# Patient Record
Sex: MALE | Race: WHITE | NOT HISPANIC OR LATINO
[De-identification: names, ages, dates, MRNs, and addresses within clinical notes are randomized per-mention and may not be internally consistent; named-entity substitution may affect disease eponyms.]

---

## 2018-12-05 PROBLEM — Z00.00 ENCOUNTER FOR PREVENTIVE HEALTH EXAMINATION: Status: ACTIVE | Noted: 2018-12-05

## 2018-12-06 ENCOUNTER — APPOINTMENT (OUTPATIENT)
Dept: SURGICAL ONCOLOGY | Facility: CLINIC | Age: 83
End: 2018-12-06
Payer: MEDICARE

## 2018-12-06 VITALS
OXYGEN SATURATION: 97 % | BODY MASS INDEX: 29.8 KG/M2 | HEART RATE: 72 BPM | SYSTOLIC BLOOD PRESSURE: 155 MMHG | HEIGHT: 72 IN | WEIGHT: 220 LBS | DIASTOLIC BLOOD PRESSURE: 74 MMHG

## 2018-12-06 PROCEDURE — 99204 OFFICE O/P NEW MOD 45 MIN: CPT

## 2018-12-14 ENCOUNTER — RESULT REVIEW (OUTPATIENT)
Age: 83
End: 2018-12-14

## 2018-12-30 ENCOUNTER — FORM ENCOUNTER (OUTPATIENT)
Age: 83
End: 2018-12-30

## 2018-12-31 ENCOUNTER — OUTPATIENT (OUTPATIENT)
Dept: OUTPATIENT SERVICES | Facility: HOSPITAL | Age: 83
LOS: 1 days | End: 2018-12-31
Payer: MEDICARE

## 2018-12-31 VITALS
HEIGHT: 73 IN | DIASTOLIC BLOOD PRESSURE: 92 MMHG | OXYGEN SATURATION: 99 % | WEIGHT: 216.93 LBS | RESPIRATION RATE: 18 BRPM | TEMPERATURE: 97 F | HEART RATE: 62 BPM | SYSTOLIC BLOOD PRESSURE: 155 MMHG

## 2018-12-31 DIAGNOSIS — H70.91 UNSPECIFIED MASTOIDITIS, RIGHT EAR: Chronic | ICD-10-CM

## 2018-12-31 DIAGNOSIS — Z90.49 ACQUIRED ABSENCE OF OTHER SPECIFIED PARTS OF DIGESTIVE TRACT: Chronic | ICD-10-CM

## 2018-12-31 DIAGNOSIS — C43.59 MALIGNANT MELANOMA OF OTHER PART OF TRUNK: ICD-10-CM

## 2018-12-31 DIAGNOSIS — I48.91 UNSPECIFIED ATRIAL FIBRILLATION: ICD-10-CM

## 2018-12-31 DIAGNOSIS — Z01.818 ENCOUNTER FOR OTHER PREPROCEDURAL EXAMINATION: ICD-10-CM

## 2018-12-31 DIAGNOSIS — E11.9 TYPE 2 DIABETES MELLITUS WITHOUT COMPLICATIONS: ICD-10-CM

## 2018-12-31 DIAGNOSIS — Z98.890 OTHER SPECIFIED POSTPROCEDURAL STATES: Chronic | ICD-10-CM

## 2018-12-31 DIAGNOSIS — I10 ESSENTIAL (PRIMARY) HYPERTENSION: ICD-10-CM

## 2018-12-31 DIAGNOSIS — Z98.49 CATARACT EXTRACTION STATUS, UNSPECIFIED EYE: Chronic | ICD-10-CM

## 2018-12-31 LAB
ANION GAP SERPL CALC-SCNC: 14 MMOL/L — SIGNIFICANT CHANGE UP (ref 5–17)
BUN SERPL-MCNC: 34 MG/DL — HIGH (ref 7–23)
CALCIUM SERPL-MCNC: 9.4 MG/DL — SIGNIFICANT CHANGE UP (ref 8.4–10.5)
CHLORIDE SERPL-SCNC: 106 MMOL/L — SIGNIFICANT CHANGE UP (ref 96–108)
CO2 SERPL-SCNC: 23 MMOL/L — SIGNIFICANT CHANGE UP (ref 22–31)
CREAT SERPL-MCNC: 1.62 MG/DL — HIGH (ref 0.5–1.3)
GLUCOSE SERPL-MCNC: 335 MG/DL — HIGH (ref 70–99)
HCT VFR BLD CALC: 43.2 % — SIGNIFICANT CHANGE UP (ref 39–50)
HGB BLD-MCNC: 14.4 G/DL — SIGNIFICANT CHANGE UP (ref 13–17)
LDH SERPL L TO P-CCNC: 268 U/L — HIGH (ref 50–242)
MCHC RBC-ENTMCNC: 31.4 PG — SIGNIFICANT CHANGE UP (ref 27–34)
MCHC RBC-ENTMCNC: 33.3 GM/DL — SIGNIFICANT CHANGE UP (ref 32–36)
MCV RBC AUTO: 94.3 FL — SIGNIFICANT CHANGE UP (ref 80–100)
PLATELET # BLD AUTO: 166 K/UL — SIGNIFICANT CHANGE UP (ref 150–400)
POTASSIUM SERPL-MCNC: 4.3 MMOL/L — SIGNIFICANT CHANGE UP (ref 3.5–5.3)
POTASSIUM SERPL-SCNC: 4.3 MMOL/L — SIGNIFICANT CHANGE UP (ref 3.5–5.3)
RBC # BLD: 4.58 M/UL — SIGNIFICANT CHANGE UP (ref 4.2–5.8)
RBC # FLD: 13.7 % — SIGNIFICANT CHANGE UP (ref 10.3–14.5)
SODIUM SERPL-SCNC: 143 MMOL/L — SIGNIFICANT CHANGE UP (ref 135–145)
WBC # BLD: 7.16 K/UL — SIGNIFICANT CHANGE UP (ref 3.8–10.5)
WBC # FLD AUTO: 7.16 K/UL — SIGNIFICANT CHANGE UP (ref 3.8–10.5)

## 2018-12-31 PROCEDURE — 80048 BASIC METABOLIC PNL TOTAL CA: CPT

## 2018-12-31 PROCEDURE — 85027 COMPLETE CBC AUTOMATED: CPT

## 2018-12-31 PROCEDURE — 83615 LACTATE (LD) (LDH) ENZYME: CPT

## 2018-12-31 PROCEDURE — 71046 X-RAY EXAM CHEST 2 VIEWS: CPT

## 2018-12-31 PROCEDURE — 71046 X-RAY EXAM CHEST 2 VIEWS: CPT | Mod: 26

## 2018-12-31 PROCEDURE — G0463: CPT

## 2018-12-31 RX ORDER — LIDOCAINE HCL 20 MG/ML
0.2 VIAL (ML) INJECTION ONCE
Qty: 0 | Refills: 0 | Status: DISCONTINUED | OUTPATIENT
Start: 2019-01-10 | End: 2019-01-25

## 2018-12-31 RX ORDER — SODIUM CHLORIDE 9 MG/ML
3 INJECTION INTRAMUSCULAR; INTRAVENOUS; SUBCUTANEOUS EVERY 8 HOURS
Qty: 0 | Refills: 0 | Status: DISCONTINUED | OUTPATIENT
Start: 2019-01-10 | End: 2019-01-25

## 2018-12-31 NOTE — H&P PST ADULT - ATTENDING COMMENTS
83-year-old man with an uncomplicated 0.8 mm melanoma on his left upper back schedule for appropriate excision with reconstruction by Dr. Johnston.    His diagnosis and the plan of management were reviewed with him and his wife in my office, and again the morning of operation.    All questions answered, consent on chart

## 2018-12-31 NOTE — H&P PST ADULT - PSH
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

## 2018-12-31 NOTE — H&P PST ADULT - PROBLEM SELECTOR PLAN 2
Pt to decrease Lantus by 20% night prior, last dose of Glyburide in AM day prior to sx and last dose of Apidra w/ dinner night prior to sx. Pt instructed to check fingerstick in AM day of procedure.

## 2018-12-31 NOTE — H&P PST ADULT - PROBLEM SELECTOR PLAN 1
Pt scheduled for procedure on 1/10/2019. Surgical and Chlorhexidine instructions reviewed w/ pt and spouse.

## 2018-12-31 NOTE — H&P PST ADULT - HISTORY OF PRESENT ILLNESS
83yr old male presents to PST foe a Wide Excision Melanoma Left Upper Back w/ Complex Closure of The Back on 1/10/2019. 83yr old male presents to PST foe a Wide Excision Melanoma Left Upper Back w/ Complex Closure of The Back on 1/10/2019. PMH: Afib (s/p ablation ~15 yrs ago, on Pradaxa), HTN, HLD, DM2 (on insulin) and melanoma on left upper back. Pt to see PCP on 1/3/2019 for medical evaluation. Last seen by Cardiologist ~1 month ago, echo/stress done pt states all normal. Denies chest pain or SOB and feels well otherwise. 83yr old male presents to PST for a Wide Excision Melanoma Left Upper Back w/ Complex Closure of The Back on 1/10/2019. PMH: Afib (s/p ablation ~15 yrs ago, on Pradaxa), HTN, HLD, DM2 (on insulin) and melanoma on left upper back. Pt to see PCP on 1/3/2019 for medical evaluation. Last seen by Cardiologist ~1 month ago, echo/stress done pt states all normal. Denies chest pain or SOB and feels well otherwise.

## 2018-12-31 NOTE — H&P PST ADULT - PMH
Afib    Diabetes mellitus type 2, insulin dependent    Hyperlipidemia    Hypertension    Malignant melanoma of other part of trunk

## 2019-01-03 PROBLEM — C43.59 MALIGNANT MELANOMA OF OTHER PART OF TRUNK: Chronic | Status: ACTIVE | Noted: 2018-12-31

## 2019-01-03 PROBLEM — E11.9 TYPE 2 DIABETES MELLITUS WITHOUT COMPLICATIONS: Chronic | Status: ACTIVE | Noted: 2018-12-31

## 2019-01-03 PROBLEM — E78.5 HYPERLIPIDEMIA, UNSPECIFIED: Chronic | Status: ACTIVE | Noted: 2018-12-31

## 2019-01-03 PROBLEM — I48.91 UNSPECIFIED ATRIAL FIBRILLATION: Chronic | Status: ACTIVE | Noted: 2018-12-31

## 2019-01-03 PROBLEM — I10 ESSENTIAL (PRIMARY) HYPERTENSION: Chronic | Status: ACTIVE | Noted: 2018-12-31

## 2019-01-08 ENCOUNTER — APPOINTMENT (OUTPATIENT)
Dept: PLASTIC SURGERY | Facility: CLINIC | Age: 84
End: 2019-01-08
Payer: MEDICARE

## 2019-01-08 PROCEDURE — 99203 OFFICE O/P NEW LOW 30 MIN: CPT

## 2019-01-08 NOTE — HISTORY OF PRESENT ILLNESS
[FreeTextEntry1] : 83-year-old referred by Dr. Abbott with the recent diagnosis of an uncomplicated 0.75 mm, Jhon's level III, T1a melanoma of his left upper back.\par \par This was an asymptomatic pigmented lesion discovered on routine dermatologic evaluation.\par \par In the past he has had non-melanoma skin cancers.\par There is no prior personal history of melanoma.\par \par There is no other personal history of malignancy.\par \par There are no relatives of skin cancer.\par \par There are no relatives with cancer.\par \par His internist is Dr. Brent Padilla.\par He does not have a pacemaker or defibrillator.\par \par He does have atrial fibrillation and MR for which he takes daily PRADAXA.\par His cardiologist is Dr. Erlin Christianson.\par Hypertension is treated with atenolol, amlodipine, lisinopril, and Lasix.\par \par He is also DIABETIC.\par His endocrinologist is Dr. Noman Stahl.\par He takes daily Apidra, Lantus, and glyburide\par \par His dermatologist is Dr. Munira Alfaro.\par \par None examination in November 2018 by Dr. Sanchez was unremarkable.\par \par His last colonoscopy was @ 75

## 2019-01-10 ENCOUNTER — OUTPATIENT (OUTPATIENT)
Dept: OUTPATIENT SERVICES | Facility: HOSPITAL | Age: 84
LOS: 1 days | End: 2019-01-10
Payer: MEDICARE

## 2019-01-10 ENCOUNTER — APPOINTMENT (OUTPATIENT)
Dept: SURGICAL ONCOLOGY | Facility: HOSPITAL | Age: 84
End: 2019-01-10

## 2019-01-10 ENCOUNTER — RESULT REVIEW (OUTPATIENT)
Age: 84
End: 2019-01-10

## 2019-01-10 VITALS
HEART RATE: 62 BPM | RESPIRATION RATE: 18 BRPM | HEIGHT: 73 IN | SYSTOLIC BLOOD PRESSURE: 167 MMHG | WEIGHT: 216.93 LBS | DIASTOLIC BLOOD PRESSURE: 92 MMHG | OXYGEN SATURATION: 99 % | TEMPERATURE: 97 F

## 2019-01-10 VITALS
OXYGEN SATURATION: 100 % | HEART RATE: 51 BPM | SYSTOLIC BLOOD PRESSURE: 154 MMHG | RESPIRATION RATE: 15 BRPM | DIASTOLIC BLOOD PRESSURE: 65 MMHG

## 2019-01-10 DIAGNOSIS — Z98.49 CATARACT EXTRACTION STATUS, UNSPECIFIED EYE: Chronic | ICD-10-CM

## 2019-01-10 DIAGNOSIS — H70.91 UNSPECIFIED MASTOIDITIS, RIGHT EAR: Chronic | ICD-10-CM

## 2019-01-10 DIAGNOSIS — C43.59 MALIGNANT MELANOMA OF OTHER PART OF TRUNK: ICD-10-CM

## 2019-01-10 DIAGNOSIS — Z98.890 OTHER SPECIFIED POSTPROCEDURAL STATES: Chronic | ICD-10-CM

## 2019-01-10 DIAGNOSIS — Z90.49 ACQUIRED ABSENCE OF OTHER SPECIFIED PARTS OF DIGESTIVE TRACT: Chronic | ICD-10-CM

## 2019-01-10 LAB — GLUCOSE BLDC GLUCOMTR-MCNC: 136 MG/DL — HIGH (ref 70–99)

## 2019-01-10 PROCEDURE — 13101 CMPLX RPR TRUNK 2.6-7.5 CM: CPT

## 2019-01-10 PROCEDURE — 82962 GLUCOSE BLOOD TEST: CPT

## 2019-01-10 PROCEDURE — 88305 TISSUE EXAM BY PATHOLOGIST: CPT | Mod: 26

## 2019-01-10 PROCEDURE — 88305 TISSUE EXAM BY PATHOLOGIST: CPT

## 2019-01-10 PROCEDURE — 88342 IMHCHEM/IMCYTCHM 1ST ANTB: CPT

## 2019-01-10 PROCEDURE — 88342 IMHCHEM/IMCYTCHM 1ST ANTB: CPT | Mod: 26

## 2019-01-10 PROCEDURE — 88341 IMHCHEM/IMCYTCHM EA ADD ANTB: CPT

## 2019-01-10 PROCEDURE — 88341 IMHCHEM/IMCYTCHM EA ADD ANTB: CPT | Mod: 26

## 2019-01-10 PROCEDURE — 11604 EXC TR-EXT MAL+MARG 3.1-4 CM: CPT

## 2019-01-10 PROCEDURE — 14001 TIS TRNFR TRUNK 10.1-30SQCM: CPT

## 2019-01-10 RX ORDER — SODIUM CHLORIDE 9 MG/ML
1000 INJECTION, SOLUTION INTRAVENOUS
Qty: 0 | Refills: 0 | Status: DISCONTINUED | OUTPATIENT
Start: 2019-01-10 | End: 2019-01-25

## 2019-01-10 RX ORDER — CELECOXIB 200 MG/1
200 CAPSULE ORAL ONCE
Qty: 0 | Refills: 0 | Status: DISCONTINUED | OUTPATIENT
Start: 2019-01-10 | End: 2019-01-25

## 2019-01-10 RX ORDER — ONDANSETRON 8 MG/1
4 TABLET, FILM COATED ORAL ONCE
Qty: 0 | Refills: 0 | Status: DISCONTINUED | OUTPATIENT
Start: 2019-01-10 | End: 2019-01-25

## 2019-01-10 RX ORDER — FUROSEMIDE 40 MG
1 TABLET ORAL
Qty: 0 | Refills: 0 | COMMUNITY

## 2019-01-10 RX ORDER — DABIGATRAN ETEXILATE MESYLATE 150 MG/1
1 CAPSULE ORAL
Qty: 0 | Refills: 0 | COMMUNITY

## 2019-01-10 RX ORDER — CHLORZOXAZONE 250 MG
1 TABLET ORAL
Qty: 0 | Refills: 0 | COMMUNITY

## 2019-01-10 RX ORDER — AMLODIPINE BESYLATE 2.5 MG/1
1 TABLET ORAL
Qty: 0 | Refills: 0 | COMMUNITY

## 2019-01-10 RX ORDER — INSULIN GLULISINE 100 [IU]/ML
9 INJECTION, SOLUTION SUBCUTANEOUS
Qty: 0 | Refills: 0 | COMMUNITY

## 2019-01-10 RX ORDER — ATENOLOL 25 MG/1
1 TABLET ORAL
Qty: 0 | Refills: 0 | COMMUNITY

## 2019-01-10 RX ORDER — ATORVASTATIN CALCIUM 80 MG/1
1 TABLET, FILM COATED ORAL
Qty: 0 | Refills: 0 | COMMUNITY

## 2019-01-10 RX ORDER — GLYBURIDE 5 MG
1 TABLET ORAL
Qty: 0 | Refills: 0 | COMMUNITY

## 2019-01-10 RX ORDER — INSULIN GLARGINE 100 [IU]/ML
11 INJECTION, SOLUTION SUBCUTANEOUS
Qty: 0 | Refills: 0 | COMMUNITY

## 2019-01-10 RX ORDER — LISINOPRIL 2.5 MG/1
1 TABLET ORAL
Qty: 0 | Refills: 0 | COMMUNITY

## 2019-01-10 NOTE — PRE-ANESTHESIA EVALUATION ADULT - NSANTHPEFT_GEN_ALL_CORE
injected left conjunctiva.  pt states asymptomatic,   advised to follow-up and seek medical attention if continues or worsens  pt and wife understand

## 2019-01-10 NOTE — ASU DISCHARGE PLAN (ADULT/PEDIATRIC). - ITEMS TO FOLLOWUP WITH YOUR PHYSICIAN'S
Initial followup with plastic surgery in the next week or two.    Dr. Abbott should call with pathology report by January 21, the conversation will determine further management

## 2019-01-10 NOTE — ASU DISCHARGE PLAN (ADULT/PEDIATRIC). - NOTIFY
Fever greater than 101/Numbness, tingling/Inability to Tolerate Liquids or Foods/Pain not relieved by Medications/Persistent Nausea and Vomiting/Unable to Urinate/Increased Irritability or Sluggishness/Swelling that continues/Bleeding that does not stop/Numbness, color, or temperature change to extremity/Excessive Diarrhea

## 2019-01-10 NOTE — PRE-ANESTHESIA EVALUATION ADULT - NSANTHPMHFT_GEN_ALL_CORE
fs 136  norm stress 11/18  denies CP, SOB, or palpitations  meal related heartburn  no anesthetic complications

## 2019-01-10 NOTE — ASU PATIENT PROFILE, ADULT - PSH
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

## 2019-01-10 NOTE — BRIEF OPERATIVE NOTE - PROCEDURE
<<-----Click on this checkbox to enter Procedure Melanoma excision  01/10/2019  Left upper back, reconstruction  Active  MBEG

## 2019-01-16 LAB — SURGICAL PATHOLOGY STUDY: SIGNIFICANT CHANGE UP

## 2019-02-01 ENCOUNTER — APPOINTMENT (OUTPATIENT)
Dept: PLASTIC SURGERY | Facility: CLINIC | Age: 84
End: 2019-02-01
Payer: MEDICARE

## 2019-02-01 VITALS
BODY MASS INDEX: 29.8 KG/M2 | SYSTOLIC BLOOD PRESSURE: 159 MMHG | HEIGHT: 72 IN | WEIGHT: 220 LBS | OXYGEN SATURATION: 100 % | HEART RATE: 52 BPM | DIASTOLIC BLOOD PRESSURE: 76 MMHG

## 2019-02-01 PROCEDURE — 99024 POSTOP FOLLOW-UP VISIT: CPT

## 2019-02-01 NOTE — HISTORY OF PRESENT ILLNESS
[FreeTextEntry1] : 83-year-old presents for follow up visit status post uncomplicated 0.75 mm, Jhon's level III, T1a melanoma of his left upper back with complex closure. Patient is doing well no fever no chills no drainage pain well-controlled \par \par This was an asymptomatic pigmented lesion discovered on routine dermatologic evaluation.\par \par In the past he has had non-melanoma skin cancers.\par There is no prior personal history of melanoma.\par \par There is no other personal history of malignancy.\par \par There are no relatives of skin cancer.\par \par There are no relatives with cancer.\par \par His internist is Dr. Bernt Padilla.\par He does not have a pacemaker or defibrillator.\par \par He does have atrial fibrillation and MR for which he takes daily PRADAXA.\par His cardiologist is Dr. Erlin Christianson.\par Hypertension is treated with atenolol, amlodipine, lisinopril, and Lasix.\par \par He is also DIABETIC.\par His endocrinologist is Dr. Noman Stahl.\par He takes daily Apidra, Lantus, and glyburide\par \par His dermatologist is Dr. Munira Alfaro.\par \par None examination in November 2018 by Dr. Sanchez was unremarkable.\par \par His last colonoscopy was @ 75

## 2019-03-25 ENCOUNTER — APPOINTMENT (OUTPATIENT)
Dept: SURGICAL ONCOLOGY | Facility: CLINIC | Age: 84
End: 2019-03-25
Payer: MEDICARE

## 2019-03-25 VITALS
SYSTOLIC BLOOD PRESSURE: 151 MMHG | DIASTOLIC BLOOD PRESSURE: 61 MMHG | BODY MASS INDEX: 29.8 KG/M2 | WEIGHT: 220 LBS | HEIGHT: 72 IN | RESPIRATION RATE: 16 BRPM | HEART RATE: 53 BPM | TEMPERATURE: 97.5 F

## 2019-03-25 PROCEDURE — 99212 OFFICE O/P EST SF 10 MIN: CPT

## 2019-03-25 NOTE — ASSESSMENT
[FreeTextEntry1] : January 2019 preoperative chest x-ray was normal.\par Will repeat in one year, January 2020...................................\par \par Preoperative LDH was slightly elevated (268).\par Prescription provided to have the study repeated at his convenience, the spring.\par \par Clinically doing well.\par \par If no problems we will see him in approximately 6 months, sooner if needed

## 2019-03-25 NOTE — HISTORY OF PRESENT ILLNESS
[de-identified] : First postoperative visit for this 83-year-old gentleman who January 10, 2019, had wide excision of an uncomplicated 0.8 mm melanoma from his PHUONG BACK, with negative margins, and reconstruction by Dr. Johnston.\par \par \par December 2018 he was referred by his dermatologist Dr. Munira DALE with the recent diagnosis of an uncomplicated 0.75 mm, Jhon's level III, T1a melanoma of his left upper back.\par \par This was an asymptomatic pigmented lesion discovered on routine dermatologic evaluation.\par \par In the past he has had non-melanoma skin cancers.\par There is no prior personal history of melanoma.\par \par There is no other personal history of malignancy.\par \par There are no relatives of skin cancer.\par \par There are no relatives with cancer.\par \par His internist is Dr. Brent ELLIS.\par He does not have a pacemaker or defibrillator.\par \par He does have atrial fibrillation and MR for which he takes daily PRADAXA.\par His cardiologist is Dr. Erlin CHRISTIAN.\par Hypertension is treated with atenolol, amlodipine, lisinopril, and Lasix.\par \par He is also DIABETIC.\par His endocrinologist is Dr. Noman ALMENDAREZ.\par He takes daily Apidra, Lantus, and glyburide\par \par His dermatologist is Dr. Munira Dale.\par \par An eye examination in November 2018 by Dr. Sanchez was unremarkable.\par \par His last colonoscopy was @ 75\par

## 2019-03-25 NOTE — PHYSICAL EXAM
[Normal] : supple, no neck mass and thyroid not enlarged [Normal Neck Lymph Nodes] : normal neck lymph nodes  [Normal Supraclavicular Lymph Nodes] : normal supraclavicular lymph nodes [Normal Axillary Lymph Nodes] : normal axillary lymph nodes [Normal] : full range of motion and no deformities appreciated [de-identified] : groins not examined [de-identified] : Below

## 2019-03-25 NOTE — REVIEW OF SYSTEMS
[Negative] : Heme/Lymph [FreeTextEntry5] : irregular heart rate [de-identified] : Melanoma [de-identified] : Diabetes

## 2019-10-14 ENCOUNTER — APPOINTMENT (OUTPATIENT)
Dept: SURGICAL ONCOLOGY | Facility: CLINIC | Age: 84
End: 2019-10-14
Payer: MEDICARE

## 2019-10-14 VITALS
SYSTOLIC BLOOD PRESSURE: 147 MMHG | HEIGHT: 72 IN | WEIGHT: 216 LBS | DIASTOLIC BLOOD PRESSURE: 71 MMHG | OXYGEN SATURATION: 98 % | HEART RATE: 42 BPM | BODY MASS INDEX: 29.26 KG/M2

## 2019-10-14 DIAGNOSIS — C43.59 MALIGNANT MELANOMA OF OTHER PART OF TRUNK: ICD-10-CM

## 2019-10-14 PROCEDURE — 99214 OFFICE O/P EST MOD 30 MIN: CPT

## 2019-10-14 NOTE — PHYSICAL EXAM
[Normal] : supple, no neck mass and thyroid not enlarged [Normal Neck Lymph Nodes] : normal neck lymph nodes  [Normal Supraclavicular Lymph Nodes] : normal supraclavicular lymph nodes [Normal] : full range of motion and no deformities appreciated [Normal Axillary Lymph Nodes] : normal axillary lymph nodes [de-identified] : groins not examined [de-identified] : Below

## 2019-10-14 NOTE — ASSESSMENT
[FreeTextEntry1] : December 2018, preoperative LDH was slightly elevated, 288.\par Repeat determination, March 2019, still slightly elevated: 227.\par Prescription provided to have the study repeated again, presently.\par \par December 2018 preoperative chest x-ray was normal.\par Prescription entered to have the study repeated December 2019.\par \par Clinically doing well.\par \par If no abnormalities on diagnostic imaging, and asymptomatic with a normal dermatologic exam, we will see him in another 6 months, sooner if needed

## 2019-10-14 NOTE — REVIEW OF SYSTEMS
[Negative] : Heme/Lymph [FreeTextEntry5] : Hypertension [de-identified] : diabetes [de-identified] : Melanoma

## 2019-10-14 NOTE — HISTORY OF PRESENT ILLNESS
[de-identified] : 84 year-old gentleman who January 10, 2019, had wide excision of an uncomplicated 0.8 mm melanoma from his LEFT upper BACK, with negative margins, and reconstruction by Dr. Johnston.\par \par \par December 2018 he was referred by his dermatologist Dr. Munira DALE with an uncomplicated 0.75 mm, Jhon's level III, T1a melanoma of his left upper back.\par \par This was an asymptomatic pigmented lesion discovered on routine dermatologic evaluation.\par \par In the past he has had non-melanoma skin cancers.\par No prior personal history of melanoma.\par \par No other personal history of malignancy.\par \par No relatives of skin cancer.\par \par No relatives with cancer.\par \par His internist is Dr. Brent ELLIS.\par \par He does not have a pacemaker or defibrillator.\par \par He does have atrial fibrillation and MR for which he takes daily PRADAXA.\par His cardiologist is Dr. Erlin CHRISTIAN.\par Hypertension is treated with atenolol, amlodipine, lisinopril, and Lasix.\par \par He is also DIABETIC.\par His endocrinologist is Dr. Noman ALMENDAREZ.\par He takes daily Apidra, Lantus, and glyburide\par \par His dermatologist is Dr. Munira DALE.\par May 2019 visit was unremarkable\par \par An eye examination in November 2018 by Dr. Sanchez was unremarkable.\par \par His last colonoscopy was @ 75\par

## 2020-05-18 ENCOUNTER — APPOINTMENT (OUTPATIENT)
Dept: SURGICAL ONCOLOGY | Facility: CLINIC | Age: 85
End: 2020-05-18
